# Patient Record
Sex: FEMALE | Race: ASIAN | NOT HISPANIC OR LATINO | Employment: FULL TIME | URBAN - METROPOLITAN AREA
[De-identification: names, ages, dates, MRNs, and addresses within clinical notes are randomized per-mention and may not be internally consistent; named-entity substitution may affect disease eponyms.]

---

## 2021-04-21 ENCOUNTER — IMMUNIZATION (OUTPATIENT)
Dept: FAMILY MEDICINE | Facility: CLINIC | Age: 31
End: 2021-04-21
Payer: COMMERCIAL

## 2021-04-21 DIAGNOSIS — Z23 NEED FOR VACCINATION: Primary | ICD-10-CM

## 2021-04-21 PROCEDURE — 91300 COVID-19, MRNA, LNP-S, PF, 30 MCG/0.3 ML DOSE VACCINE: CPT | Mod: PBBFAC | Performed by: FAMILY MEDICINE

## 2021-05-12 ENCOUNTER — IMMUNIZATION (OUTPATIENT)
Dept: FAMILY MEDICINE | Facility: CLINIC | Age: 31
End: 2021-05-12
Payer: OTHER GOVERNMENT

## 2021-05-12 DIAGNOSIS — Z23 NEED FOR VACCINATION: Primary | ICD-10-CM

## 2021-05-12 PROCEDURE — 91300 COVID-19, MRNA, LNP-S, PF, 30 MCG/0.3 ML DOSE VACCINE: CPT | Mod: PBBFAC | Performed by: FAMILY MEDICINE

## 2021-05-12 PROCEDURE — 0002A COVID-19, MRNA, LNP-S, PF, 30 MCG/0.3 ML DOSE VACCINE: CPT | Mod: PBBFAC | Performed by: FAMILY MEDICINE

## 2022-11-15 ENCOUNTER — OFFICE VISIT (OUTPATIENT)
Dept: URGENT CARE | Facility: CLINIC | Age: 32
End: 2022-11-15
Payer: COMMERCIAL

## 2022-11-15 VITALS
SYSTOLIC BLOOD PRESSURE: 116 MMHG | WEIGHT: 119 LBS | DIASTOLIC BLOOD PRESSURE: 81 MMHG | TEMPERATURE: 98 F | BODY MASS INDEX: 19.83 KG/M2 | OXYGEN SATURATION: 97 % | HEART RATE: 102 BPM | HEIGHT: 65 IN

## 2022-11-15 DIAGNOSIS — J10.1 INFLUENZA A: Primary | ICD-10-CM

## 2022-11-15 LAB
CTP QC/QA: YES
POC MOLECULAR INFLUENZA A AGN: POSITIVE
POC MOLECULAR INFLUENZA B AGN: NEGATIVE

## 2022-11-15 PROCEDURE — 3079F PR MOST RECENT DIASTOLIC BLOOD PRESSURE 80-89 MM HG: ICD-10-PCS | Mod: CPTII,S$GLB,, | Performed by: FAMILY MEDICINE

## 2022-11-15 PROCEDURE — 87502 INFLUENZA DNA AMP PROBE: CPT | Mod: QW,S$GLB,, | Performed by: FAMILY MEDICINE

## 2022-11-15 PROCEDURE — 3008F BODY MASS INDEX DOCD: CPT | Mod: CPTII,S$GLB,, | Performed by: FAMILY MEDICINE

## 2022-11-15 PROCEDURE — 1159F MED LIST DOCD IN RCRD: CPT | Mod: CPTII,S$GLB,, | Performed by: FAMILY MEDICINE

## 2022-11-15 PROCEDURE — 3074F SYST BP LT 130 MM HG: CPT | Mod: CPTII,S$GLB,, | Performed by: FAMILY MEDICINE

## 2022-11-15 PROCEDURE — 1160F PR REVIEW ALL MEDS BY PRESCRIBER/CLIN PHARMACIST DOCUMENTED: ICD-10-PCS | Mod: CPTII,S$GLB,, | Performed by: FAMILY MEDICINE

## 2022-11-15 PROCEDURE — 87502 POCT INFLUENZA A/B MOLECULAR: ICD-10-PCS | Mod: QW,S$GLB,, | Performed by: FAMILY MEDICINE

## 2022-11-15 PROCEDURE — 3079F DIAST BP 80-89 MM HG: CPT | Mod: CPTII,S$GLB,, | Performed by: FAMILY MEDICINE

## 2022-11-15 PROCEDURE — 3074F PR MOST RECENT SYSTOLIC BLOOD PRESSURE < 130 MM HG: ICD-10-PCS | Mod: CPTII,S$GLB,, | Performed by: FAMILY MEDICINE

## 2022-11-15 PROCEDURE — 3008F PR BODY MASS INDEX (BMI) DOCUMENTED: ICD-10-PCS | Mod: CPTII,S$GLB,, | Performed by: FAMILY MEDICINE

## 2022-11-15 PROCEDURE — 99203 PR OFFICE/OUTPT VISIT, NEW, LEVL III, 30-44 MIN: ICD-10-PCS | Mod: S$GLB,,, | Performed by: FAMILY MEDICINE

## 2022-11-15 PROCEDURE — 1159F PR MEDICATION LIST DOCUMENTED IN MEDICAL RECORD: ICD-10-PCS | Mod: CPTII,S$GLB,, | Performed by: FAMILY MEDICINE

## 2022-11-15 PROCEDURE — 99203 OFFICE O/P NEW LOW 30 MIN: CPT | Mod: S$GLB,,, | Performed by: FAMILY MEDICINE

## 2022-11-15 PROCEDURE — 1160F RVW MEDS BY RX/DR IN RCRD: CPT | Mod: CPTII,S$GLB,, | Performed by: FAMILY MEDICINE

## 2022-11-15 RX ORDER — PROMETHAZINE HYDROCHLORIDE 6.25 MG/5ML
SYRUP ORAL
Qty: 120 ML | Refills: 1 | Status: SHIPPED | OUTPATIENT
Start: 2022-11-15 | End: 2023-04-17

## 2022-11-15 RX ORDER — OSELTAMIVIR PHOSPHATE 75 MG/1
75 CAPSULE ORAL 2 TIMES DAILY
Qty: 10 CAPSULE | Refills: 0 | Status: SHIPPED | OUTPATIENT
Start: 2022-11-15 | End: 2022-11-20

## 2022-11-15 NOTE — PROGRESS NOTES
"Subjective:       Patient ID: Lucien Palencia is a 32 y.o. female.    Vitals:  height is 5' 5" (1.651 m) and weight is 54 kg (119 lb). Her temperature is 98.1 °F (36.7 °C). Her blood pressure is 116/81 and her pulse is 102. Her oxygen saturation is 97%.     Chief Complaint: Cough (Fever and cough)    Patient is a 33 y/o Female that is Sinhala with a cough and fever. Symptoms of cough and sore throat  and fever started 2 days ago. Ibuprofen and Mucinex taken.     Cough  This is a new problem. The current episode started in the past 7 days. The cough is Non-productive.   Respiratory:  Positive for cough.      Objective:      Physical Exam      Physical Exam  Vitals signs and nursing note reviewed.   Constitutional:       Appearance: Pt is well-developed. Alert, NAD.  Pt is cooperative.  Non-toxic appearance.  HENT:      Head: Normocephalic and atraumatic. .      Right Ear: External ear normal.      Left Ear: External ear normal.   Eyes:      General: Lids are normal.      Conjunctiva/sclera: Conjunctivae normal. Visual tracking is normal. Right eye exhibits no exudate. Left eye exhibits no exudate. No scleral icterus.     Pupils: Pupils are equal, round  Neck:      Musculoskeletal: range of motion without pain and neck supple.      Trachea: Trachea and phonation normal.   Cardiovascular:      Rate and Rhythm: Normal Rhythm. Extremities well perfused.   Pulmonary:      Effort: Pulmonary effort is normal. No respiratory distress.     Breath sounds: Normal breath sounds.   Abdomen: NO obvious distention.  Musculoskeletal: Normal range of motion. No ambulation issues  Skin:     General: Skin is warm and dry. No open wounds or abrasions. No petechiae No cyanosis  no jaundice not diaphoretic, not pale, not purpuric  Neurological:      Mental Status:Pt is alert and oriented to person, place, and time.   Psychiatric:         Speech: Speech normal.         Behavior: Behavior normal.         Thought Content: Thought content " normal.         Judgment: Judgment normal.       Assessment:       1. Influenza A          Plan:         Influenza A    Other orders  -     oseltamivir (TAMIFLU) 75 MG capsule; Take 1 capsule (75 mg total) by mouth 2 (two) times daily. for 5 days  Dispense: 10 capsule; Refill: 0  -     promethazine (PHENERGAN) 6.25 mg/5 mL syrup; 10mL at night as needed  Dispense: 120 mL; Refill: 1

## 2023-04-24 PROBLEM — E13.9 DIABETES 1.5, MANAGED AS TYPE 1: Status: ACTIVE | Noted: 2023-04-24

## 2023-04-24 PROBLEM — E78.00 HYPERCHOLESTEROLEMIA: Status: ACTIVE | Noted: 2023-04-24

## 2023-05-02 ENCOUNTER — OFFICE VISIT (OUTPATIENT)
Dept: ENDOCRINOLOGY | Facility: CLINIC | Age: 33
End: 2023-05-02
Payer: COMMERCIAL

## 2023-05-02 VITALS
SYSTOLIC BLOOD PRESSURE: 92 MMHG | HEART RATE: 64 BPM | BODY MASS INDEX: 16.36 KG/M2 | HEIGHT: 68 IN | WEIGHT: 107.94 LBS | DIASTOLIC BLOOD PRESSURE: 60 MMHG

## 2023-05-02 DIAGNOSIS — E13.9 LADA (LATENT AUTOIMMUNE DIABETES IN ADULTS), MANAGED AS TYPE 2: Primary | ICD-10-CM

## 2023-05-02 DIAGNOSIS — E78.00 HYPERCHOLESTEROLEMIA: ICD-10-CM

## 2023-05-02 DIAGNOSIS — E13.9 DIABETES 1.5, MANAGED AS TYPE 1: ICD-10-CM

## 2023-05-02 PROCEDURE — 1160F PR REVIEW ALL MEDS BY PRESCRIBER/CLIN PHARMACIST DOCUMENTED: ICD-10-PCS | Mod: CPTII,S$GLB,, | Performed by: NURSE PRACTITIONER

## 2023-05-02 PROCEDURE — 3008F BODY MASS INDEX DOCD: CPT | Mod: CPTII,S$GLB,, | Performed by: NURSE PRACTITIONER

## 2023-05-02 PROCEDURE — 3061F NEG MICROALBUMINURIA REV: CPT | Mod: CPTII,S$GLB,, | Performed by: NURSE PRACTITIONER

## 2023-05-02 PROCEDURE — 95251 PR GLUCOSE MONITOR, 72 HOUR, PHYS INTERP: ICD-10-PCS | Mod: S$GLB,,, | Performed by: NURSE PRACTITIONER

## 2023-05-02 PROCEDURE — 99999 PR PBB SHADOW E&M-EST. PATIENT-LVL IV: CPT | Mod: PBBFAC,,, | Performed by: NURSE PRACTITIONER

## 2023-05-02 PROCEDURE — 3061F PR NEG MICROALBUMINURIA RESULT DOCUMENTED/REVIEW: ICD-10-PCS | Mod: CPTII,S$GLB,, | Performed by: NURSE PRACTITIONER

## 2023-05-02 PROCEDURE — 3078F DIAST BP <80 MM HG: CPT | Mod: CPTII,S$GLB,, | Performed by: NURSE PRACTITIONER

## 2023-05-02 PROCEDURE — 99204 PR OFFICE/OUTPT VISIT, NEW, LEVL IV, 45-59 MIN: ICD-10-PCS | Mod: S$GLB,,, | Performed by: NURSE PRACTITIONER

## 2023-05-02 PROCEDURE — 1160F RVW MEDS BY RX/DR IN RCRD: CPT | Mod: CPTII,S$GLB,, | Performed by: NURSE PRACTITIONER

## 2023-05-02 PROCEDURE — 95251 CONT GLUC MNTR ANALYSIS I&R: CPT | Mod: S$GLB,,, | Performed by: NURSE PRACTITIONER

## 2023-05-02 PROCEDURE — 99999 PR PBB SHADOW E&M-EST. PATIENT-LVL IV: ICD-10-PCS | Mod: PBBFAC,,, | Performed by: NURSE PRACTITIONER

## 2023-05-02 PROCEDURE — 3074F PR MOST RECENT SYSTOLIC BLOOD PRESSURE < 130 MM HG: ICD-10-PCS | Mod: CPTII,S$GLB,, | Performed by: NURSE PRACTITIONER

## 2023-05-02 PROCEDURE — 3074F SYST BP LT 130 MM HG: CPT | Mod: CPTII,S$GLB,, | Performed by: NURSE PRACTITIONER

## 2023-05-02 PROCEDURE — 1159F MED LIST DOCD IN RCRD: CPT | Mod: CPTII,S$GLB,, | Performed by: NURSE PRACTITIONER

## 2023-05-02 PROCEDURE — 1159F PR MEDICATION LIST DOCUMENTED IN MEDICAL RECORD: ICD-10-PCS | Mod: CPTII,S$GLB,, | Performed by: NURSE PRACTITIONER

## 2023-05-02 PROCEDURE — 3046F HEMOGLOBIN A1C LEVEL >9.0%: CPT | Mod: CPTII,S$GLB,, | Performed by: NURSE PRACTITIONER

## 2023-05-02 PROCEDURE — 3046F PR MOST RECENT HEMOGLOBIN A1C LEVEL > 9.0%: ICD-10-PCS | Mod: CPTII,S$GLB,, | Performed by: NURSE PRACTITIONER

## 2023-05-02 PROCEDURE — 3066F PR DOCUMENTATION OF TREATMENT FOR NEPHROPATHY: ICD-10-PCS | Mod: CPTII,S$GLB,, | Performed by: NURSE PRACTITIONER

## 2023-05-02 PROCEDURE — 3078F PR MOST RECENT DIASTOLIC BLOOD PRESSURE < 80 MM HG: ICD-10-PCS | Mod: CPTII,S$GLB,, | Performed by: NURSE PRACTITIONER

## 2023-05-02 PROCEDURE — 99204 OFFICE O/P NEW MOD 45 MIN: CPT | Mod: S$GLB,,, | Performed by: NURSE PRACTITIONER

## 2023-05-02 PROCEDURE — 3008F PR BODY MASS INDEX (BMI) DOCUMENTED: ICD-10-PCS | Mod: CPTII,S$GLB,, | Performed by: NURSE PRACTITIONER

## 2023-05-02 PROCEDURE — 3066F NEPHROPATHY DOC TX: CPT | Mod: CPTII,S$GLB,, | Performed by: NURSE PRACTITIONER

## 2023-05-02 RX ORDER — METFORMIN HYDROCHLORIDE 500 MG/1
500 TABLET ORAL 2 TIMES DAILY WITH MEALS
Qty: 60 TABLET | Refills: 6 | Status: SHIPPED | OUTPATIENT
Start: 2023-05-02 | End: 2023-08-08 | Stop reason: SDUPTHER

## 2023-05-02 RX ORDER — PEN NEEDLE, DIABETIC 29 G X1/2"
NEEDLE, DISPOSABLE MISCELLANEOUS
COMMUNITY
Start: 2023-04-28

## 2023-05-02 RX ORDER — BLOOD-GLUCOSE SENSOR
EACH MISCELLANEOUS
Qty: 3 EACH | Refills: 6 | Status: SHIPPED | OUTPATIENT
Start: 2023-05-02 | End: 2023-07-15 | Stop reason: SDUPTHER

## 2023-05-02 NOTE — PROGRESS NOTES
CC: Ms. Lucien Palencia arrives today for management of KARY, managed as Type 2 DM and review of chronic medical conditions, as listed in the Visit Diagnosis section of this encounter.       HPI: Ms. Lucien Palencia was diagnosed with KARY, managed as Type 2 DM in 4/2023. She experienced weight loss, fatigue leading up to diagnosis. She was diagnosed based on lab work with A1c of 10.4%. When checked in April, C-peptide was normal at 0.88 but MELODY +, indicating KARY. Initial treatment consisted of insulin. + FH of DM in maternal GM, maternal uncle, maternal aunt. Denies hospitalizations due to DM.     This is her first time being seen in endocrine.     She was diagnosed with DM in April 2023 following lab work. Her PCP initiated basal insulin on 4/28. She also started Dexcom G6 last week and had her first diabetes education session yesterday.     BG monitoring per Dexcom G7.    Hypoglycemia: No    Missing Insulin/PO medication doses: No  Timing prandial insulin 5-15 minutes before meals: n/a    Exercise: Yes, walks on treadmill 3 days/week    Dietary Habits:  Eats 3 meals/day. Trying to eat more vegetables and protein. Drinks celery juice with apple added.    Last DM education appointment: 5/1/2023    Her PCP recently added rosuvastatin for hyperlipidemia.       CURRENT DIABETIC MEDS:  Toujeo 10 units QHS  Vial or pen: pen  Glucometer type:     Previous DM treatments:    Last Eye Exam: n/a  Last Podiatry Exam: n/a    REVIEW OF SYSTEMS  Constitutional: no c/o fatigue, weakness. + 12# weight loss since 11/2022   Eyes: denies visual disturbances.  Cardiac: no palpitations or chest pain.  Respiratory: no cough or dyspnea.  GI: no c/o abdominal pain or nausea. Denies h/o pancreatitis.  : denies urinary frequency, burning, discharge, frequent UTIs  Skin: no lesions or rashes.  Neuro: + numbness, tingling in L great toe  Endocrine: denies polyphagia, polydipsia, polyuria      Personally reviewed Past Medical, Surgical, Social  "History.    Vital Signs  BP 92/60   Pulse 64   Ht 5' 8" (1.727 m)   Wt 48.9 kg (107 lb 14.6 oz)   BMI 16.41 kg/m²      Personally reviewed the below labs:     Latest Reference Range & Units 04/19/23 10:00   Hemoglobin A1c referral test <=5.6 % 10.4 (H)   (H): Data is abnormally high    No results found for: HGBA1C    Chemistry        Component Value Date/Time     04/17/2023 0910    K 4.9 04/17/2023 0910     04/17/2023 0910    CO2 23 04/17/2023 0910    BUN 15 04/17/2023 0910    CREATININE 0.34 (L) 04/17/2023 0910     (H) 04/17/2023 0910        Component Value Date/Time    CALCIUM 9.3 04/17/2023 0910    ALKPHOS 66 04/17/2023 0910    AST 34 04/17/2023 0910    ALT 21 04/17/2023 0910    BILITOT 1.1 04/17/2023 0910          Lab Results   Component Value Date    CHOL 209 (H) 04/17/2023     Lab Results   Component Value Date    HDL 53 04/17/2023     Lab Results   Component Value Date    LDLCALC 139.8 04/17/2023     Lab Results   Component Value Date    TRIG 81 04/17/2023     Lab Results   Component Value Date    CHOLHDL 25.4 04/17/2023       Lab Results   Component Value Date    MICALBCREAT Unable to calculate 04/19/2023     Lab Results   Component Value Date    TSH 1.470 04/17/2023       CrCl cannot be calculated (Patient's most recent lab result is older than the maximum 7 days allowed.).    No results found for: [id number]     Latest Reference Range & Units 04/19/23 10:00   C-Peptide 0.78 - 5.19 ng/mL 0.88   Glutamic Acid Decarb Ab 0.0 - 5.0 IU/mL 63.2 (H)   (H): Data is abnormally high      PHYSICAL EXAMINATION  Constitutional: Appears well, no distress  Respiratory: CTA, even and unlabored.  Cardiovascular: RRR, no murmurs, no carotid bruits.   GI: bowel sounds active, no hernia noted  Skin: warm and dry; no visible wounds  Neuro: oriented to person, place, time  Feet: appropriate footwear.  Protective Sensation (w/ 10 gram monofilament):  Right: Intact  Left: Intact    Visual " Inspection:  Normal -  Bilateral    Pedal Pulses:   Right: Present  Left: Present    Posterior Tibialis Pulses:   Right:Present  Left: Present      DEXCOM G7 DOWNLOAD: See media file for details. Fasting glucoses now at goal since initiation of insulin. Prandial excursions are noted but have decreased over the past couple of days. No hypoglycemia.   Average glucose: 221 mg/dL  Above 250 mg/dL: 27 %  181-250 mg/dL: 48 %   mg/dL: 25 %  54-69 mg/dL: 0 %  Below 54 mg/dL: 0 %      Assessment/Plan  1. 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 Explained KARY diagnosis and the importance of close glucose monitoring. C-peptide currently normal but lower end of normal. Likely in honeymoon phase. Will plan to repeat in the future with a fasting glucose. Fasting glucoses have decreased since starting insulin.  Before adding prandial insulin, will trial metformin and can consider adding GLP-1RA once established on metformin if prandial excursions continue. Insurance likely wouldn't cover branded Type 2 med options without first using metformin.   -- start metformin 500 mg daily. Increase to BID after 1 week  -- continue Toujeo 10 units QHS  -- BG monitoring per Dexcom G7    -- Discussed diagnosis of DM, A1c goals, progression of disease, long term complications and tx options.  -- Reviewed hypoglycemia management: treat with 4 oz of juice, 4 oz regular soda, or 4 glucose tablets. Monitor and repeat treatment every 15 minutes until BG is >70 Then have a snack, which includes 15 grams of complex carbohydrates and protein.   Advised patient to check BG before activities, such as driving or exercise.   2. Hypercholesterolemia  -- not on rosuvastatin        FOLLOW UP  Follow up in about 4 weeks (around 5/30/2023).   Patient instructed to bring BG logs to each follow up   Patient encouraged to call for any BG/medication issues, concerns, or questions.    No orders of the defined types were  placed in this encounter.

## 2023-05-02 NOTE — PATIENT INSTRUCTIONS
Blood sugar targets:  Fasting/pre-meal:   2 hours after meal: < 180    Start metformin 500 mg daily before breakfast.  After 1 week, add 1 tablet with dinner.    Notify me if blood sugars are < 80 or > 180 often.

## 2023-05-15 PROBLEM — E13.9 DIABETES 1.5, MANAGED AS TYPE 1: Status: RESOLVED | Noted: 2023-04-24 | Resolved: 2023-05-15

## 2023-05-15 PROBLEM — E13.9 LADA (LATENT AUTOIMMUNE DIABETES IN ADULTS), MANAGED AS TYPE 2: Status: ACTIVE | Noted: 2023-05-15

## 2023-05-31 ENCOUNTER — OFFICE VISIT (OUTPATIENT)
Dept: ENDOCRINOLOGY | Facility: CLINIC | Age: 33
End: 2023-05-31
Payer: COMMERCIAL

## 2023-05-31 VITALS
HEIGHT: 68 IN | SYSTOLIC BLOOD PRESSURE: 90 MMHG | DIASTOLIC BLOOD PRESSURE: 70 MMHG | HEART RATE: 87 BPM | BODY MASS INDEX: 16.39 KG/M2 | WEIGHT: 108.13 LBS

## 2023-05-31 DIAGNOSIS — E13.9 LADA (LATENT AUTOIMMUNE DIABETES IN ADULTS), MANAGED AS TYPE 2: Primary | ICD-10-CM

## 2023-05-31 DIAGNOSIS — E78.00 HYPERCHOLESTEROLEMIA: ICD-10-CM

## 2023-05-31 PROCEDURE — 99214 PR OFFICE/OUTPT VISIT, EST, LEVL IV, 30-39 MIN: ICD-10-PCS | Mod: S$GLB,,, | Performed by: NURSE PRACTITIONER

## 2023-05-31 PROCEDURE — 3066F NEPHROPATHY DOC TX: CPT | Mod: CPTII,S$GLB,, | Performed by: NURSE PRACTITIONER

## 2023-05-31 PROCEDURE — 1160F PR REVIEW ALL MEDS BY PRESCRIBER/CLIN PHARMACIST DOCUMENTED: ICD-10-PCS | Mod: CPTII,S$GLB,, | Performed by: NURSE PRACTITIONER

## 2023-05-31 PROCEDURE — 3078F PR MOST RECENT DIASTOLIC BLOOD PRESSURE < 80 MM HG: ICD-10-PCS | Mod: CPTII,S$GLB,, | Performed by: NURSE PRACTITIONER

## 2023-05-31 PROCEDURE — 3074F PR MOST RECENT SYSTOLIC BLOOD PRESSURE < 130 MM HG: ICD-10-PCS | Mod: CPTII,S$GLB,, | Performed by: NURSE PRACTITIONER

## 2023-05-31 PROCEDURE — 95251 PR GLUCOSE MONITOR, 72 HOUR, PHYS INTERP: ICD-10-PCS | Mod: S$GLB,,, | Performed by: NURSE PRACTITIONER

## 2023-05-31 PROCEDURE — 3008F PR BODY MASS INDEX (BMI) DOCUMENTED: ICD-10-PCS | Mod: CPTII,S$GLB,, | Performed by: NURSE PRACTITIONER

## 2023-05-31 PROCEDURE — 1159F MED LIST DOCD IN RCRD: CPT | Mod: CPTII,S$GLB,, | Performed by: NURSE PRACTITIONER

## 2023-05-31 PROCEDURE — 3061F NEG MICROALBUMINURIA REV: CPT | Mod: CPTII,S$GLB,, | Performed by: NURSE PRACTITIONER

## 2023-05-31 PROCEDURE — 3078F DIAST BP <80 MM HG: CPT | Mod: CPTII,S$GLB,, | Performed by: NURSE PRACTITIONER

## 2023-05-31 PROCEDURE — 99214 OFFICE O/P EST MOD 30 MIN: CPT | Mod: S$GLB,,, | Performed by: NURSE PRACTITIONER

## 2023-05-31 PROCEDURE — 3008F BODY MASS INDEX DOCD: CPT | Mod: CPTII,S$GLB,, | Performed by: NURSE PRACTITIONER

## 2023-05-31 PROCEDURE — 3046F PR MOST RECENT HEMOGLOBIN A1C LEVEL > 9.0%: ICD-10-PCS | Mod: CPTII,S$GLB,, | Performed by: NURSE PRACTITIONER

## 2023-05-31 PROCEDURE — 3061F PR NEG MICROALBUMINURIA RESULT DOCUMENTED/REVIEW: ICD-10-PCS | Mod: CPTII,S$GLB,, | Performed by: NURSE PRACTITIONER

## 2023-05-31 PROCEDURE — 3074F SYST BP LT 130 MM HG: CPT | Mod: CPTII,S$GLB,, | Performed by: NURSE PRACTITIONER

## 2023-05-31 PROCEDURE — 1160F RVW MEDS BY RX/DR IN RCRD: CPT | Mod: CPTII,S$GLB,, | Performed by: NURSE PRACTITIONER

## 2023-05-31 PROCEDURE — 99999 PR PBB SHADOW E&M-EST. PATIENT-LVL III: CPT | Mod: PBBFAC,,, | Performed by: NURSE PRACTITIONER

## 2023-05-31 PROCEDURE — 95251 CONT GLUC MNTR ANALYSIS I&R: CPT | Mod: S$GLB,,, | Performed by: NURSE PRACTITIONER

## 2023-05-31 PROCEDURE — 99999 PR PBB SHADOW E&M-EST. PATIENT-LVL III: ICD-10-PCS | Mod: PBBFAC,,, | Performed by: NURSE PRACTITIONER

## 2023-05-31 PROCEDURE — 1159F PR MEDICATION LIST DOCUMENTED IN MEDICAL RECORD: ICD-10-PCS | Mod: CPTII,S$GLB,, | Performed by: NURSE PRACTITIONER

## 2023-05-31 PROCEDURE — 3066F PR DOCUMENTATION OF TREATMENT FOR NEPHROPATHY: ICD-10-PCS | Mod: CPTII,S$GLB,, | Performed by: NURSE PRACTITIONER

## 2023-05-31 PROCEDURE — 3046F HEMOGLOBIN A1C LEVEL >9.0%: CPT | Mod: CPTII,S$GLB,, | Performed by: NURSE PRACTITIONER

## 2023-05-31 NOTE — PROGRESS NOTES
"CC: Ms. Lucien Palencia arrives today for management of KARY, managed as Type 2 DM and review of chronic medical conditions, as listed in the Visit Diagnosis section of this encounter.       HPI: Ms. Lucien Palencia was diagnosed with KARY, managed as Type 2 DM in 4/2023. She experienced weight loss, fatigue leading up to diagnosis. She was diagnosed based on lab work with A1c of 10.4%. When checked in April, C-peptide was normal at 0.88 but MELODY +, indicating KARY. Initial treatment consisted of insulin. + FH of DM in maternal GM, maternal uncle, maternal aunt. Denies hospitalizations due to DM.     Patient presents for 4 week follow up. Since C-peptide is still normal (in honeymoon phase), metformin was initiated to accompany her Toujeo.      BG monitoring per Dexcom G7.    Hypoglycemia: No    Missing Insulin/PO medication doses: No  Timing prandial insulin 5-15 minutes before meals: n/a    Exercise: Yes, walks on treadmill 3 days/week for 40 min    Dietary Habits:  Eats 3 meals/day. 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 May eat Greek yogurt with berries, almonds. Drinks celery juice with apple added.    Last DM education appointment: 5/1/2023      CURRENT DIABETIC MEDS:  metformin 500 mg BID, Toujeo 10 units QHS  Vial or pen: pen  Glucometer type:     Previous DM treatments:    Last Eye Exam: n/a  Last Podiatry Exam: n/a    REVIEW OF SYSTEMS  Constitutional: no c/o fatigue, weakness, weight loss  Eyes: denies visual disturbances.  Cardiac: no palpitations or chest pain.  Respiratory: no cough or dyspnea.  GI: no c/o abdominal pain or nausea. Denies h/o pancreatitis.  Skin: no lesions or rashes.  Neuro: + numbness, tingling in L great toe. This has improved.   Endocrine: denies polyphagia, polydipsia, polyuria      Personally reviewed Past Medical, Surgical, Social History.    Vital Signs  BP 90/70   Pulse 87   Ht 5' 8" (1.727 m)   Wt 49 kg (108 lb 2.2 " oz)   LMP 04/27/2023   BMI 16.44 kg/m²      Personally reviewed the below labs:     Latest Reference Range & Units 04/19/23 10:00   Hemoglobin A1c referral test <=5.6 % 10.4 (H)   (H): Data is abnormally high    No results found for: HGBA1C    Chemistry        Component Value Date/Time     04/17/2023 0910    K 4.9 04/17/2023 0910     04/17/2023 0910    CO2 23 04/17/2023 0910    BUN 15 04/17/2023 0910    CREATININE 0.34 (L) 04/17/2023 0910     (H) 04/17/2023 0910        Component Value Date/Time    CALCIUM 9.3 04/17/2023 0910    ALKPHOS 66 04/17/2023 0910    AST 34 04/17/2023 0910    ALT 21 04/17/2023 0910    BILITOT 1.1 04/17/2023 0910          Lab Results   Component Value Date    CHOL 209 (H) 04/17/2023     Lab Results   Component Value Date    HDL 53 04/17/2023     Lab Results   Component Value Date    LDLCALC 139.8 04/17/2023     Lab Results   Component Value Date    TRIG 81 04/17/2023     Lab Results   Component Value Date    CHOLHDL 25.4 04/17/2023       Lab Results   Component Value Date    MICALBCREAT Unable to calculate 04/19/2023     Lab Results   Component Value Date    TSH 1.470 04/17/2023       CrCl cannot be calculated (Patient's most recent lab result is older than the maximum 7 days allowed.).    No results found for: [id number]     Latest Reference Range & Units 04/19/23 10:00   C-Peptide 0.78 - 5.19 ng/mL 0.88   Glutamic Acid Decarb Ab 0.0 - 5.0 IU/mL 63.2 (H)   (H): Data is abnormally high      PHYSICAL EXAMINATION  Deferred       DEXCOM G7 DOWNLOAD: See media file for details. Fasting glucoses are at goal. Sporadic prandial excursions are noted but not daily.  No hypoglycemia.   Average glucose: 140 mg/dL  Above 250 mg/dL: 1 %  181-250 mg/dL: 12 %   mg/dL: 87 %  54-69 mg/dL: 0 %  Below 54 mg/dL: 0 %      Assessment/Plan  1. 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 Explained KARY diagnosis and the importance of close glucose  monitoring. Likely in honeymoon phase. Will plan to repeat C-peptide at next appt with a fasting glucose  -- continue Toujeo, metformin  -- avoid SGLT2-i for now, due to risk of insulinopenia, as pt in honeymoon phase. Increased risk of euglycemic DKA.   -- BG monitoring per Dexcom G7  -- advised pt to notify me if BG frequently >180 after meals.     -- Discussed diagnosis of DM, A1c goals, progression of disease, long term complications and tx options.  -- Reviewed hypoglycemia management: treat with 4 oz of juice, 4 oz regular soda, or 4 glucose tablets. Monitor and repeat treatment every 15 minutes until BG is >70 Then have a snack, which includes 15 grams of complex carbohydrates and protein.   Advised patient to check BG before activities, such as driving or exercise.   2. Hypercholesterolemia  -- now on rosuvastatin, per PCP  -- lipid panel with RTC       FOLLOW UP  Follow up in about 2 months (around 7/31/2023).   Patient instructed to bring BG logs to each follow up   Patient encouraged to call for any BG/medication issues, concerns, or questions.    Orders Placed This Encounter   Procedures    Hemoglobin A1C    Glucose, Fasting    Lipid Panel    C-Peptide

## 2023-07-07 DIAGNOSIS — E13.9 DIABETES 1.5, MANAGED AS TYPE 1: ICD-10-CM

## 2023-07-07 RX ORDER — INSULIN GLARGINE 300 U/ML
10 INJECTION, SOLUTION SUBCUTANEOUS NIGHTLY
Qty: 3 ML | Refills: 0 | OUTPATIENT
Start: 2023-07-07 | End: 2023-10-05

## 2023-07-07 NOTE — TELEPHONE ENCOUNTER
----- Message from Meghan Courtney sent at 7/7/2023  2:32 PM CDT -----  Regarding: advice  Contact: Patient  Type:  RX Refill Request    Who Called:  Patient  // Sudheer   Refill or New Rx:  refill  RX Name and Strength:  insulin glargine, TOUJEO, (TOUJEO SOLOSTAR U-300 INSULIN) 300 unit/mL (1.5 mL) InPn pen 3 mL 0 4/28/2023 7/27/2023   Sig - Route: Inject 10 Units into the skin every evening. - Subcutaneous   Sent to pharmacy as: insulin glargine, TOUJEO, (TOUJEO SOLOSTAR U-300 INSULIN) 300 unit/mL (1.5 mL) InPn pen   Notes to Pharmacy: Dispense with needles   E-Prescribing Status: Receipt confirmed by pharmacy (4/28/2023  2:07 PM CDT)       How is the patient currently taking it? (ex. 1XDay):  see above   Is this a 30 day or 90 day RX:  see above   Preferred Pharmacy with phone number:    [id number] - [facility], [address] [facility], [id number] [place] [id number]  Phone: [phone] Fax: [phone]      Local or Mail Order:    Ordering Provider:    Best Call Back Number:  [phone] (home)     Additional Information:  Patient  that patient is out of this medication and in need of a refill. Please contact to advise. Thanks!

## 2023-07-15 DIAGNOSIS — E13.9 LADA (LATENT AUTOIMMUNE DIABETES IN ADULTS), MANAGED AS TYPE 2: ICD-10-CM

## 2023-07-18 RX ORDER — BLOOD-GLUCOSE SENSOR
EACH MISCELLANEOUS
Qty: 3 EACH | Refills: 6 | Status: SHIPPED | OUTPATIENT
Start: 2023-07-18 | End: 2023-08-16 | Stop reason: SDUPTHER

## 2023-08-08 DIAGNOSIS — E13.9 LADA (LATENT AUTOIMMUNE DIABETES IN ADULTS), MANAGED AS TYPE 2: ICD-10-CM

## 2023-08-08 RX ORDER — METFORMIN HYDROCHLORIDE 500 MG/1
500 TABLET ORAL 2 TIMES DAILY WITH MEALS
Qty: 60 TABLET | Refills: 6 | Status: SHIPPED | OUTPATIENT
Start: 2023-08-08 | End: 2024-08-07

## 2023-08-16 DIAGNOSIS — E13.9 LADA (LATENT AUTOIMMUNE DIABETES IN ADULTS), MANAGED AS TYPE 2: ICD-10-CM

## 2023-08-16 RX ORDER — BLOOD-GLUCOSE SENSOR
EACH MISCELLANEOUS
Qty: 3 EACH | Refills: 6 | Status: SHIPPED | OUTPATIENT
Start: 2023-08-16 | End: 2023-09-08 | Stop reason: SDUPTHER

## 2023-08-22 ENCOUNTER — OFFICE VISIT (OUTPATIENT)
Dept: ENDOCRINOLOGY | Facility: CLINIC | Age: 33
End: 2023-08-22
Payer: COMMERCIAL

## 2023-08-22 VITALS
DIASTOLIC BLOOD PRESSURE: 60 MMHG | HEIGHT: 68 IN | BODY MASS INDEX: 16.24 KG/M2 | WEIGHT: 107.13 LBS | HEART RATE: 65 BPM | SYSTOLIC BLOOD PRESSURE: 90 MMHG

## 2023-08-22 DIAGNOSIS — E78.00 HYPERCHOLESTEROLEMIA: ICD-10-CM

## 2023-08-22 DIAGNOSIS — E13.9 LADA (LATENT AUTOIMMUNE DIABETES IN ADULTS), MANAGED AS TYPE 2: Primary | ICD-10-CM

## 2023-08-22 DIAGNOSIS — E13.9 DIABETES 1.5, MANAGED AS TYPE 1: ICD-10-CM

## 2023-08-22 PROCEDURE — 1159F PR MEDICATION LIST DOCUMENTED IN MEDICAL RECORD: ICD-10-PCS | Mod: CPTII,S$GLB,, | Performed by: NURSE PRACTITIONER

## 2023-08-22 PROCEDURE — 3044F HG A1C LEVEL LT 7.0%: CPT | Mod: CPTII,S$GLB,, | Performed by: NURSE PRACTITIONER

## 2023-08-22 PROCEDURE — 3078F PR MOST RECENT DIASTOLIC BLOOD PRESSURE < 80 MM HG: ICD-10-PCS | Mod: CPTII,S$GLB,, | Performed by: NURSE PRACTITIONER

## 2023-08-22 PROCEDURE — 3061F PR NEG MICROALBUMINURIA RESULT DOCUMENTED/REVIEW: ICD-10-PCS | Mod: CPTII,S$GLB,, | Performed by: NURSE PRACTITIONER

## 2023-08-22 PROCEDURE — 1159F MED LIST DOCD IN RCRD: CPT | Mod: CPTII,S$GLB,, | Performed by: NURSE PRACTITIONER

## 2023-08-22 PROCEDURE — 1160F PR REVIEW ALL MEDS BY PRESCRIBER/CLIN PHARMACIST DOCUMENTED: ICD-10-PCS | Mod: CPTII,S$GLB,, | Performed by: NURSE PRACTITIONER

## 2023-08-22 PROCEDURE — 3008F PR BODY MASS INDEX (BMI) DOCUMENTED: ICD-10-PCS | Mod: CPTII,S$GLB,, | Performed by: NURSE PRACTITIONER

## 2023-08-22 PROCEDURE — 99999 PR PBB SHADOW E&M-EST. PATIENT-LVL III: CPT | Mod: PBBFAC,,, | Performed by: NURSE PRACTITIONER

## 2023-08-22 PROCEDURE — 3074F SYST BP LT 130 MM HG: CPT | Mod: CPTII,S$GLB,, | Performed by: NURSE PRACTITIONER

## 2023-08-22 PROCEDURE — 3044F PR MOST RECENT HEMOGLOBIN A1C LEVEL <7.0%: ICD-10-PCS | Mod: CPTII,S$GLB,, | Performed by: NURSE PRACTITIONER

## 2023-08-22 PROCEDURE — 1160F RVW MEDS BY RX/DR IN RCRD: CPT | Mod: CPTII,S$GLB,, | Performed by: NURSE PRACTITIONER

## 2023-08-22 PROCEDURE — 95251 CONT GLUC MNTR ANALYSIS I&R: CPT | Mod: S$GLB,,, | Performed by: NURSE PRACTITIONER

## 2023-08-22 PROCEDURE — 99999 PR PBB SHADOW E&M-EST. PATIENT-LVL III: ICD-10-PCS | Mod: PBBFAC,,, | Performed by: NURSE PRACTITIONER

## 2023-08-22 PROCEDURE — 3078F DIAST BP <80 MM HG: CPT | Mod: CPTII,S$GLB,, | Performed by: NURSE PRACTITIONER

## 2023-08-22 PROCEDURE — 3074F PR MOST RECENT SYSTOLIC BLOOD PRESSURE < 130 MM HG: ICD-10-PCS | Mod: CPTII,S$GLB,, | Performed by: NURSE PRACTITIONER

## 2023-08-22 PROCEDURE — 3066F PR DOCUMENTATION OF TREATMENT FOR NEPHROPATHY: ICD-10-PCS | Mod: CPTII,S$GLB,, | Performed by: NURSE PRACTITIONER

## 2023-08-22 PROCEDURE — 3008F BODY MASS INDEX DOCD: CPT | Mod: CPTII,S$GLB,, | Performed by: NURSE PRACTITIONER

## 2023-08-22 PROCEDURE — 3066F NEPHROPATHY DOC TX: CPT | Mod: CPTII,S$GLB,, | Performed by: NURSE PRACTITIONER

## 2023-08-22 PROCEDURE — 95251 PR GLUCOSE MONITOR, 72 HOUR, PHYS INTERP: ICD-10-PCS | Mod: S$GLB,,, | Performed by: NURSE PRACTITIONER

## 2023-08-22 PROCEDURE — 3061F NEG MICROALBUMINURIA REV: CPT | Mod: CPTII,S$GLB,, | Performed by: NURSE PRACTITIONER

## 2023-08-22 PROCEDURE — 99214 OFFICE O/P EST MOD 30 MIN: CPT | Mod: S$GLB,,, | Performed by: NURSE PRACTITIONER

## 2023-08-22 PROCEDURE — 99214 PR OFFICE/OUTPT VISIT, EST, LEVL IV, 30-39 MIN: ICD-10-PCS | Mod: S$GLB,,, | Performed by: NURSE PRACTITIONER

## 2023-08-22 RX ORDER — INSULIN GLARGINE 300 U/ML
9 INJECTION, SOLUTION SUBCUTANEOUS NIGHTLY
Qty: 3 ML | Refills: 3
Start: 2023-08-22 | End: 2023-11-20

## 2023-08-22 NOTE — PROGRESS NOTES
"CC: Ms. Lucien Palencia arrives today for management of KARY, managed as Type 2 DM and review of chronic medical conditions, as listed in the Visit Diagnosis section of this encounter.       HPI: Ms. Lucien Palencia was diagnosed with KARY, managed as Type 2 DM in 4/2023. She experienced weight loss, fatigue leading up to diagnosis. She was diagnosed based on lab work with A1c of 10.4%. When checked in April, C-peptide was normal at 0.88 but MELODY +, indicating KARY. Initial treatment consisted of insulin. + FH of DM in maternal GM, maternal uncle, maternal aunt. Denies hospitalizations due to DM.     Patient was last seen by me in May.     BG monitoring per Dexcom G7.    Hypoglycemia: Occasional - occurs upon waking  Symptoms: jittery, dizzy  Treatment: juice, soda    Missing Insulin/PO medication doses: No  Timing prandial insulin 5-15 minutes before meals: n/a    Exercise: Yes, walks on treadmill 4 days/week    Dietary Habits: Eats 3 meals/day. 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 May snack on no sugar added biscuit (reports this is SF), almonds.     Last DM education appointment: 5/1/2023      CURRENT DIABETIC MEDS:  metformin 500 mg BID, Toujeo 10 units QHS  Vial or pen: pen  Glucometer type:     Previous DM treatments:    Last Eye Exam: 8/2023,  [facility], [address]   Last Podiatry Exam: n/a    REVIEW OF SYSTEMS  Constitutional: no c/o fatigue, weakness, weight loss  Cardiac: no palpitations or chest pain.  Respiratory: no cough or dyspnea.  GI: no c/o abdominal pain or nausea. Denies h/o pancreatitis.  Skin: no lesions or rashes.  Neuro: + minimal numbness, tingling in L great toe.   Endocrine: denies polyphagia, polydipsia, polyuria      Personally reviewed Past Medical, Surgical, Social History.    Vital Signs  BP 90/60   Pulse 65   Ht 5' 8" (1.727 m)   Wt 48.6 kg (107 lb 2.3 oz)   BMI 16.29 kg/m²      Personally reviewed the below labs:     Latest Reference " "Range & Units 04/19/23 10:00   Hemoglobin A1c referral test <=5.6 % 10.4 (H)   (H): Data is abnormally high    Hemoglobin A1C   Date Value Ref Range Status   08/16/2023 5.2 0.0 - 5.6 % Final     Comment:     Reference Interval:  5.0 - 5.6 Normal   5.7 - 6.4 High Risk   > 6.5 Diabetic      Hgb A1c results are standardized based on the (NGSP) National   Glycohemoglobin Standardization Program.      Hemoglobin A1C levels are related to mean serum/plasma glucose   during the preceding 2-3 months.            Chemistry        Component Value Date/Time     04/17/2023 0910    K 4.9 04/17/2023 0910     04/17/2023 0910    CO2 23 04/17/2023 0910    BUN 15 04/17/2023 0910    CREATININE 0.34 (L) 04/17/2023 0910     (H) 04/17/2023 0910        Component Value Date/Time    CALCIUM 9.3 04/17/2023 0910    ALKPHOS 66 04/17/2023 0910    AST 34 04/17/2023 0910    ALT 21 04/17/2023 0910    BILITOT 1.1 04/17/2023 0910          Lab Results   Component Value Date    CHOL 168 08/16/2023    CHOL 209 (H) 04/17/2023     Lab Results   Component Value Date    HDL 61 08/16/2023    HDL 53 04/17/2023     Lab Results   Component Value Date    LDLCALC 100.4 08/16/2023    LDLCALC 139.8 04/17/2023     Lab Results   Component Value Date    TRIG 33 08/16/2023    TRIG 81 04/17/2023     Lab Results   Component Value Date    CHOLHDL 36.3 08/16/2023    CHOLHDL 25.4 04/17/2023       Lab Results   Component Value Date    MICALBCREAT Unable to calculate 04/19/2023     Lab Results   Component Value Date    TSH 1.470 04/17/2023       CrCl cannot be calculated (Patient's most recent lab result is older than the maximum 7 days allowed.).    No results found for: "[id number]"     Latest Reference Range & Units 08/16/23 10:43   Glucose, Fasting 70 - 110 mg/dL 81      Latest Reference Range & Units 08/16/23 10:43   C-Peptide 0.78 - 5.19 ng/mL 0.47 (L)   (L): Data is abnormally " low    ------------------------------------------------------------------------     Latest Reference Range & Units 04/19/23 10:00   C-Peptide 0.78 - 5.19 ng/mL 0.88   Glutamic Acid Decarb Ab 0.0 - 5.0 IU/mL 63.2 (H)   (H): Data is abnormally high      PHYSICAL EXAMINATION  Deferred       DEXCOM G7 DOWNLOAD: See media file for details. Fasting glucoses are mostly at goal but some borderline lows noted. Occasional prandial excursions are noted, some higher than others.  No hypoglycemia.   Average glucose: 135 mg/dL  Above 250 mg/dL: <1 %  181-250 mg/dL: 13 %   mg/dL: 86 %  54-69 mg/dL: 0 %  Below 54 mg/dL: 0 %      Assessment/Plan  1. 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 C-peptide has decreased but fasting glucose was 81 mg/dL at the time, so C-peptide result may be altered. Patient is aware of KARY diagnosis and the importance of close glucose monitoring. Still appears to be in honeymoon phase.   -- decrease Toujeo to 9 units  -- continue metformin  -- discussed likely need for prandial insulin in the future  -- BG monitoring per Dexcom G7  -- advised pt to notify me if BG frequently >180 after meals. Keep snacks to < 15 grams of carbs and meals to 30-45 grams total carbs.   -- will call for eye exam report    -- Discussed diagnosis of DM, A1c goals, progression of disease, long term complications and tx options.  -- Reviewed hypoglycemia management: treat with 4 oz of juice, 4 oz regular soda, or 4 glucose tablets. Monitor and repeat treatment every 15 minutes until BG is >70 Then have a snack, which includes 15 grams of complex carbohydrates and protein.   Advised patient to check BG before activities, such as driving or exercise.   2. Hypercholesterolemia  -- controlled  -- continue rosuvastatin       FOLLOW UP  Follow up in about 4 months (around 12/22/2023).   Patient instructed to bring BG logs to each follow up   Patient encouraged to call for any BG/medication  issues, concerns, or questions.    Orders Placed This Encounter   Procedures    Hemoglobin A1C    Comprehensive Metabolic Panel

## 2023-09-08 DIAGNOSIS — E13.9 LADA (LATENT AUTOIMMUNE DIABETES IN ADULTS), MANAGED AS TYPE 2: ICD-10-CM

## 2023-09-11 RX ORDER — BLOOD-GLUCOSE SENSOR
EACH MISCELLANEOUS
Qty: 3 EACH | Refills: 6 | Status: SHIPPED | OUTPATIENT
Start: 2023-09-11

## 2024-01-18 ENCOUNTER — TELEPHONE (OUTPATIENT)
Dept: ENDOCRINOLOGY | Facility: CLINIC | Age: 34
End: 2024-01-18

## 2024-01-18 DIAGNOSIS — E13.9 LADA (LATENT AUTOIMMUNE DIABETES IN ADULTS), MANAGED AS TYPE 2: Primary | ICD-10-CM

## 2024-01-18 NOTE — TELEPHONE ENCOUNTER
----- Message from Courtney Loving sent at 1/18/2024  9:44 AM CST -----  Regarding: Call back  Type:  Patient Returning Call    Who Called:Pt brother    Who Left Message for Patient:Sha     Does the patient know what this is regarding?:missed call    Would the patient rather a call back or a response via [facility]? Call back    Best Call Back Number:[phone]    Additional Information: Pt missed a call and would like a call back------------------ Thank you

## 2024-01-18 NOTE — TELEPHONE ENCOUNTER
S/w pt. She is moving to [place] and would like a referral send to the endocrinology overthere. See prev msg

## 2024-01-18 NOTE — TELEPHONE ENCOUNTER
----- Message from Annabel Stark sent at 1/18/2024 11:53 AM CST -----  .Type:  Patient Returning Call    Who Called: PT     Who Left Message for Patient:   NURSE HERLINDA VACA     Would the patient rather a call back     Best Call Back Number: [phone]    Additional Information:  THANK YOU

## 2024-01-18 NOTE — TELEPHONE ENCOUNTER
----- Message from Davide Pelletier sent at 1/18/2024  9:50 AM CST -----  Type:  Needs Medical Advice    Who Called: Jin  Symptoms (please be specific):  How long has patient had these symptoms:    Pharmacy name and phone #:    Would the patient rather a call back or a response via [facility]? call back  Best Call Back Number: [phone]  Additional Information: Need to speak to staff for referral endocrinology due to pt relocating  Email [email or web address]  Please advise  Thanks        Karla Vogt NP  [facility]  [address], [address], [place] [address] [phone]

## 2024-05-03 PROBLEM — E78.5 DYSLIPIDEMIA: Status: ACTIVE | Noted: 2023-04-24
